# Patient Record
Sex: MALE | Race: WHITE | NOT HISPANIC OR LATINO | URBAN - METROPOLITAN AREA
[De-identification: names, ages, dates, MRNs, and addresses within clinical notes are randomized per-mention and may not be internally consistent; named-entity substitution may affect disease eponyms.]

---

## 2017-10-20 ENCOUNTER — IMPORTED ENCOUNTER (OUTPATIENT)
Dept: URBAN - METROPOLITAN AREA CLINIC 31 | Facility: CLINIC | Age: 77
End: 2017-10-20

## 2017-10-20 PROBLEM — H25.13: Noted: 2017-10-20

## 2017-10-20 PROBLEM — H35.341: Noted: 2017-10-20

## 2017-10-20 PROBLEM — H35.371: Noted: 2017-10-20

## 2017-10-20 PROCEDURE — 92250 FUNDUS PHOTOGRAPHY W/I&R: CPT

## 2017-10-20 PROCEDURE — 92004 COMPRE OPH EXAM NEW PT 1/>: CPT

## 2017-10-20 PROCEDURE — 92134 CPTRZ OPH DX IMG PST SGM RTA: CPT

## 2017-10-20 NOTE — PATIENT DISCUSSION
1.  Nuclear Sclerotic Cataract OU: Explained how cataracts can effect vision. Recommend clinical observation. The patient was advised to contact us if any change or worsening of vision. 2. Macular Hole OD - retina consult3. Epiretinal Membrane OD - retina consult4. Return for an appointment in 1 year for comprehensive exam. Fundus Photo OCT Macula. with Dr. Isidoro Caballero.

## 2018-11-27 ENCOUNTER — IMPORTED ENCOUNTER (OUTPATIENT)
Dept: URBAN - METROPOLITAN AREA CLINIC 31 | Facility: CLINIC | Age: 78
End: 2018-11-27

## 2018-11-27 PROBLEM — H25.13: Noted: 2018-11-27

## 2018-11-27 PROCEDURE — 92250 FUNDUS PHOTOGRAPHY W/I&R: CPT

## 2018-11-27 PROCEDURE — 92134 CPTRZ OPH DX IMG PST SGM RTA: CPT

## 2018-11-27 PROCEDURE — 92014 COMPRE OPH EXAM EST PT 1/>: CPT

## 2018-11-27 PROCEDURE — 92015 DETERMINE REFRACTIVE STATE: CPT

## 2018-11-27 NOTE — PATIENT DISCUSSION
1.  Nuclear Sclerotic Cataract OU: Explained how cataracts can effect vision. Recommend clinical observation. The patient was advised to contact us if any change or worsening of vision. 2. Refractive error - recommend correcting the astigmatism. Rx given for SV reading glasses and for FT 28 bifocals. 3. PVD OU - monitor4. Macula pucker with hole OD - no significant change. Follow up with Dr. Ligia Zepeda as scheduled. 5. Return for an appointment in 1 year for comprehensive exam. and Optos OCT Macula. with Dr. Mirza Sanches.

## 2018-11-27 NOTE — PATIENT DISCUSSION
1.  Macula pucker with hole OD - no significant change. Follow up with Dr. Salina Domínguez as scheduled.

## 2018-11-27 NOTE — PATIENT DISCUSSION
Return for an appointment in 1 year for comprehensive exam. and Optos OCT Macula. with Dr. Ramon Hayward.

## 2018-11-27 NOTE — PATIENT DISCUSSION
Refractive error - recommend correcting the astigmatism. Rx given for SV reading glasses and for FT 28 bifocals.

## 2019-11-20 ENCOUNTER — IMPORTED ENCOUNTER (OUTPATIENT)
Dept: URBAN - METROPOLITAN AREA CLINIC 31 | Facility: CLINIC | Age: 79
End: 2019-11-20

## 2019-11-20 PROBLEM — H25.13: Noted: 2019-11-20

## 2019-11-20 PROCEDURE — 92015 DETERMINE REFRACTIVE STATE: CPT

## 2019-11-20 PROCEDURE — 92134 CPTRZ OPH DX IMG PST SGM RTA: CPT

## 2019-11-20 PROCEDURE — 92014 COMPRE OPH EXAM EST PT 1/>: CPT

## 2019-11-20 PROCEDURE — 92250 FUNDUS PHOTOGRAPHY W/I&R: CPT

## 2019-11-20 NOTE — PATIENT DISCUSSION
1.  Nuclear Sclerotic Cataract OU: Explained how cataracts can effect vision. Recommend clinical observation. The patient was advised to contact us if any change or worsening of vision. 2. Refractive error - can continue with readers. 3. PVD OU - monitor4. Macula pucker with hole OD - stable with no significant change. Follow up with Dr. Ankit Croft as scheduled. 5. Return for an appointment in 1 year for comprehensive exam and Optos and OCT Macula. with Dr. Marquita Cruz.

## 2022-04-02 ASSESSMENT — VISUAL ACUITY
OD_CC: 20/40-2
OD_CC: 20/40
OU_SC: 20/30
OD_CC: 20/40
OS_CC: 20/30

## 2022-04-02 ASSESSMENT — TONOMETRY
OS_IOP_MMHG: 15
OS_IOP_MMHG: 15
OD_IOP_MMHG: 16
OD_IOP_MMHG: 15
OS_IOP_MMHG: 16
OD_IOP_MMHG: 15

## 2022-11-08 ENCOUNTER — CONSULTATION/EVALUATION (OUTPATIENT)
Dept: URBAN - METROPOLITAN AREA CLINIC 29 | Facility: CLINIC | Age: 82
End: 2022-11-08

## 2022-11-08 DIAGNOSIS — H35.371: ICD-10-CM

## 2022-11-08 DIAGNOSIS — H25.13: ICD-10-CM

## 2022-11-08 PROCEDURE — 92134 CPTRZ OPH DX IMG PST SGM RTA: CPT

## 2022-11-08 PROCEDURE — 92015 DETERMINE REFRACTIVE STATE: CPT

## 2022-11-08 PROCEDURE — 99204 OFFICE O/P NEW MOD 45 MIN: CPT

## 2022-11-08 ASSESSMENT — VISUAL ACUITY
OD_SC: 20/60
OS_SC: 20/30

## 2022-11-08 ASSESSMENT — TONOMETRY
OD_IOP_MMHG: 18
OS_IOP_MMHG: 18